# Patient Record
Sex: MALE
[De-identification: names, ages, dates, MRNs, and addresses within clinical notes are randomized per-mention and may not be internally consistent; named-entity substitution may affect disease eponyms.]

---

## 2018-01-17 ENCOUNTER — HOSPITAL ENCOUNTER (EMERGENCY)
Dept: HOSPITAL 31 - C.ER | Age: 23
Discharge: HOME | End: 2018-01-17
Payer: SELF-PAY

## 2018-01-17 VITALS — SYSTOLIC BLOOD PRESSURE: 126 MMHG | DIASTOLIC BLOOD PRESSURE: 70 MMHG | HEART RATE: 84 BPM | TEMPERATURE: 98.8 F

## 2018-01-17 VITALS — RESPIRATION RATE: 18 BRPM | OXYGEN SATURATION: 98 %

## 2018-01-17 DIAGNOSIS — R10.9: Primary | ICD-10-CM

## 2018-01-17 LAB
ALBUMIN SERPL-MCNC: 4.7 G/DL (ref 3.5–5)
ALBUMIN/GLOB SERPL: 1.4 {RATIO} (ref 1–2.1)
ALT SERPL-CCNC: 20 U/L (ref 21–72)
AST SERPL-CCNC: 21 U/L (ref 17–59)
BACTERIA #/AREA URNS HPF: (no result) /[HPF]
BASOPHILS # BLD AUTO: 0 K/UL (ref 0–0.2)
BASOPHILS NFR BLD: 0.3 % (ref 0–2)
BILIRUB UR-MCNC: NEGATIVE MG/DL
BUN SERPL-MCNC: 11 MG/DL (ref 9–20)
CALCIUM SERPL-MCNC: 8.7 MG/DL (ref 8.6–10.4)
EOSINOPHIL # BLD AUTO: 0.2 K/UL (ref 0–0.7)
EOSINOPHIL NFR BLD: 0.9 % (ref 0–4)
EOSINOPHIL NFR BLD: 1 % (ref 0–4)
ERYTHROCYTE [DISTWIDTH] IN BLOOD BY AUTOMATED COUNT: 14.1 % (ref 11.5–14.5)
GFR NON-AFRICAN AMERICAN: > 60
GLUCOSE UR STRIP-MCNC: NORMAL MG/DL
HGB BLD-MCNC: 16.5 G/DL (ref 12–18)
LEUKOCYTE ESTERASE UR-ACNC: (no result) LEU/UL
LYMPHOCYTE: 5 % (ref 20–40)
LYMPHOCYTES # BLD AUTO: 0.9 K/UL (ref 1–4.3)
LYMPHOCYTES NFR BLD AUTO: 5.2 % (ref 20–40)
MCH RBC QN AUTO: 30.2 PG (ref 27–31)
MCHC RBC AUTO-ENTMCNC: 33.6 G/DL (ref 33–37)
MCV RBC AUTO: 90 FL (ref 80–94)
MONOCYTE: 8 % (ref 0–10)
MONOCYTES # BLD: 1.2 K/UL (ref 0–0.8)
MONOCYTES NFR BLD: 6.8 % (ref 0–10)
NEUTROPHILS # BLD: 14.7 K/UL (ref 1.8–7)
NEUTROPHILS NFR BLD AUTO: 85 % (ref 50–75)
NEUTROPHILS NFR BLD AUTO: 86.8 % (ref 50–75)
NEUTS BAND NFR BLD: 1 % (ref 0–2)
NRBC BLD AUTO-RTO: 0 % (ref 0–2)
PH UR STRIP: 7 [PH] (ref 5–8)
PLATELET # BLD EST: NORMAL 10*3/UL
PLATELET # BLD: 208 K/UL (ref 130–400)
PMV BLD AUTO: 9.6 FL (ref 7.2–11.7)
PROT UR STRIP-MCNC: (no result) MG/DL
RBC # BLD AUTO: 5.48 MIL/UL (ref 4.4–5.9)
RBC # UR STRIP: (no result) /UL
RBC MORPH BLD: NORMAL
SP GR UR STRIP: 1.03 (ref 1–1.03)
TOTAL CELLS COUNTED BLD: 100
URINE NITRATE: NEGATIVE
UROBILINOGEN UR-MCNC: 2 MG/DL (ref 0.2–1)
WBC # BLD AUTO: 16.9 K/UL (ref 4.8–10.8)

## 2018-01-17 PROCEDURE — 96361 HYDRATE IV INFUSION ADD-ON: CPT

## 2018-01-17 PROCEDURE — 99285 EMERGENCY DEPT VISIT HI MDM: CPT

## 2018-01-17 PROCEDURE — 87086 URINE CULTURE/COLONY COUNT: CPT

## 2018-01-17 PROCEDURE — 85025 COMPLETE CBC W/AUTO DIFF WBC: CPT

## 2018-01-17 PROCEDURE — 81001 URINALYSIS AUTO W/SCOPE: CPT

## 2018-01-17 PROCEDURE — 80053 COMPREHEN METABOLIC PANEL: CPT

## 2018-01-17 PROCEDURE — 96374 THER/PROPH/DIAG INJ IV PUSH: CPT

## 2018-01-17 PROCEDURE — 74176 CT ABD & PELVIS W/O CONTRAST: CPT

## 2018-01-17 NOTE — C.PDOC
History Of Present Illness


21 y/o male with PMhx of Kidney stones presents to ED with complaints of left 

flank pain  and intermittent fever for 3 days. Patient recently had Lithotripsy 

in [place] and came to ED with concerns of having another kidney stone. Patient 

denies dysuria, hematuria, abdominal pain or any other complaints at this time.

  


Time Seen by Provider: 01/17/18 10:37


Chief Complaint (Nursing): Abdominal Pain


History Per: Patient


History/Exam Limitations: no limitations


Onset/Duration Of Symptoms: Days


Current Symptoms Are (Timing): Still Present





Past Medical History


Reviewed: Historical Data, Nursing Documentation, Vital Signs


Vital Signs: 


 Last Vital Signs











Temp  98.8 F   01/17/18 12:45


 


Pulse  84   01/17/18 12:45


 


Resp  18   01/17/18 12:45


 


BP  126/70   01/17/18 12:45


 


Pulse Ox  98   01/17/18 12:48














- Medical History


PMH: Kidney Stones


Surgical History: No Surg Hx


Family History: States: No Known Family Hx





- Social History


Hx Alcohol Use: No


Hx Substance Use: No





- Immunization History


Hx Tetanus Toxoid Vaccination: No


Hx Influenza Vaccination: Yes


Hx Pneumococcal Vaccination: No





Review Of Systems


Constitutional: Positive for: Fever.  Negative for: Chills


Gastrointestinal: Negative for: Nausea, Vomiting, Abdominal Pain


Musculoskeletal: Positive for: Other (Flank pain).  Negative for: Back Pain


Skin: Negative for: Rash


Neurological: Negative for: Weakness, Numbness





Physical Exam





- Physical Exam


Appears: Non-toxic, No Acute Distress


Skin: Normal Color, Warm, Dry, No Rash


Head: Atraumatic, Normacephalic


Eye(s): bilateral: Normal Inspection


Oral Mucosa: Moist


Neck: Normal ROM, Supple


Cardiovascular: Rhythm Regular


Respiratory: Normal Breath Sounds, No Rales, No Rhonchi, No Wheezing


Gastrointestinal/Abdominal: Soft, No Tenderness, No Guarding, No Rebound


Back: CVA Tenderness (left), No Paraspinal Tenderness


Extremity: Normal ROM, Capillary Refill (<2 seconds)


Neurological/Psych: Oriented x3





ED Course And Treatment





- Laboratory Results


Result Diagrams: 


 01/17/18 11:01





 01/17/18 11:01


Lab Interpretation: No Acute Changes


O2 Sat by Pulse Oximetry: 98 (RA)


Pulse Ox Interpretation: Normal





- CT Scan/US


  ** No standard instances


Other Rad Studies (CT/US): Read By Radiologist, Radiology Report Reviewed


CT/US Interpretation: FINDINGS:  There is limited evaluation of the solid 

organs without the administration of IV contrast.  LOWER THORAX:  No visible 

consolidation, pleural effusion, or pneumothorax.  LIVER:  Unremarkable 

unenhanced appearance.  GALLBLADDER AND BILE DUCTS:  Unremarkable unenhanced 

appearance.  PANCREAS:  Unremarkable unenhanced appearance.  SPLEEN:  

Unremarkable unenhanced appearance.  ADRENALS:  Unremarkable unenhanced 

appearance.  KIDNEYS AND URETERS:  No hydronephrosis or obstructing renal 

calculus. Punctate nonobstructing bilateral renal calculi.  BLADDER:  The 

urinary bladder appears unremarkable.  REPRODUCTIVE:  Unremarkable.  APPENDIX:  

The appendix is not clearly identified. No secondary signs of acute 

appendicitis.  BOWEL:  The stomach is nondistended.  Lack of oral contrast 

limits evaluation for bowel pathology.   The bowel loops appear within normal 

limits of caliber without evidence of intestinal obstruction. Moderate 

constipation.  PERITONEUM:  No significant free fluid. No definite free air.  

LYMPH NODES:  No bulky lymphadenopathy identified.  VASCULATURE:  No aortic 

aneurysm.  BONES:  No acute osseous abnormality is detected.  OTHER FINDINGS:  

None.  IMPRESSION:  Bilateral punctate nonobstructing renal calculi.  No 

hydronephrosis or obstructing renal calculus identified.  Moderate constipation.


Progress Note: Treated with ZIVF NSS and toradol.  On re-evaluation abdomen 

soft in no distress.  Instructed to follow up with urology for further 

evaluation.  Discharged in stable condition


Reassessment Condition: Improved





Medical Decision Making


Medical Decision Making: 


Plan: CT abdomen/pelvis, Blood work, UA, Toradol ordered











Disposition


Counseled Patient/Family Regarding: Studies Performed, Diagnosis, Need For 

Followup, Rx Given





- Disposition


Referrals: 


[facility] [Outside]


[employer] [employer] [facility] [Outside]


Lucrecia Perera MD [Staff Provider] - 


Disposition: HOME/ ROUTINE


Disposition Time: 12:50


Condition: STABLE


Prescriptions: 


Naproxen [Naprosyn] 1 tab PO BID PRN #25 tab


 PRN Reason: Pain


Instructions:  Acute Hematuria (ED)


Forms:  [facility] (English), Work Excuse





- POA


Present On Arrival: None





- Clinical Impression


Clinical Impression: 


 Flank pain








- PA / NP / Resident Statement


MD/DO has reviewed & agrees with the documentation as recorded.





- Scribe Statement


The provider has reviewed the documentation as recorded by the Teri Oquendo





All medical record entries made by the Teri were at my direction and 

personally dictated by me. I have reviewed the chart and agree that the record 

accurately reflects my personal performance of the history, physical exam, 

medical decision making, and the department course for this patient. I have 

also personally directed, reviewed, and agree with the discharge instructions 

and disposition.

## 2018-01-17 NOTE — CT
PROCEDURE:  CT Abdomen and Pelvis without Oral or IV contrast.



HISTORY:

Pain



COMPARISON:

None available



TECHNIQUE:

Contiguous axial images of the abdomen and pelvis. No oral or IV 

contrast administered. Coronal and Sagittal reformats generated and 

reviewed. 



Radiation dose:



Total exam DLP = 311.06 mGy-cm.



This CT exam was performed using one or more of the following dose 

reduction techniques: Automated exposure control, adjustment of the 

mA and/or kV according to patient size, and/or use of iterative 

reconstruction technique.



FINDINGS:

There is limited evaluation of the solid organs without the 

administration of IV contrast.



LOWER THORAX:

No visible consolidation, pleural effusion, or pneumothorax.



LIVER:

Unremarkable unenhanced appearance.



GALLBLADDER AND BILE DUCTS:

Unremarkable unenhanced appearance.



PANCREAS:

Unremarkable unenhanced appearance.



SPLEEN:

Unremarkable unenhanced appearance.



ADRENALS:

Unremarkable unenhanced appearance.



KIDNEYS AND URETERS:

No hydronephrosis or obstructing renal calculus. Punctate 

nonobstructing bilateral renal calculi. 



BLADDER:

The urinary bladder appears unremarkable.



REPRODUCTIVE:

Unremarkable.



APPENDIX:

The appendix is not clearly identified. No secondary signs of acute 

appendicitis.



BOWEL:

The stomach is nondistended. 



Lack of oral contrast limits evaluation for bowel pathology.   The 

bowel loops appear within normal limits of caliber without evidence 

of intestinal obstruction. Moderate constipation. 



PERITONEUM:

No significant free fluid. No definite free air.



LYMPH NODES:

No bulky lymphadenopathy identified.



VASCULATURE:

No aortic aneurysm. 



BONES:

No acute osseous abnormality is detected.



OTHER FINDINGS:

None. 



IMPRESSION:

Bilateral punctate nonobstructing renal calculi.  No hydronephrosis 

or obstructing renal calculus identified.



Moderate constipation.



Additional findings as above.

## 2018-11-05 NOTE — C.PDOC
History Of Present Illness


22 y/o male presents to the ED for evaluation of a hand laceration sustained 1 

hour prior to arrival. While at home taking out trash, patient tripped and fell 

onto a garbage bag that had glass, sustaining a large laceration to the dorsal 

left hand. Patient is right hand dominant. He reports being unable to extend the

2nd, 3rd, and 4th digits. Also complains of slight numbness.





Time Seen by Provider: 11/05/18 19:49


Chief Complaint (Nursing): Abnormal Skin Integrity


History Per: Patient


History/Exam Limitations: no limitations


Onset/Duration Of Symptoms: Hrs (x1)


Current Symptoms Are (Timing): Still Present


Location Of Injury: Left: Hand





Past Medical History


Reviewed: Historical Data, Nursing Documentation, Vital Signs


Vital Signs: 





                                Last Vital Signs











Temp  98.4 F   11/05/18 19:46


 


Pulse  99 H  11/05/18 19:46


 


Resp  20   11/05/18 19:46


 


BP  133/76   11/05/18 19:46


 


Pulse Ox  99   11/05/18 19:46














- Medical History


PMH: No Chronic Diseases, Kidney Stones


Other Surgeries: Tendon surgery in the LEft hand


Family History: States: No Known Family Hx





- Social History


Hx Alcohol Use: No


Hx Substance Use: No





- Immunization History


Hx Tetanus Toxoid Vaccination: No


Hx Influenza Vaccination: No


Hx Pneumococcal Vaccination: No





Review Of Systems


Except As Marked, All Systems Reviewed And Found Negative.


Constitutional: Negative for: Fever


Musculoskeletal: Positive for: Hand Pain


Skin: Positive for: Lesions (to dorsal left hand)


Neurological: Positive for: Weakness (difficulty moving left digits), Numbness. 

Negative for: Other (tingling)





Physical Exam





- Physical Exam


Appears: Non-toxic, No Acute Distress


Skin: Warm, Dry, No Rash


Head: Atraumatic, Normacephalic


Eye(s): bilateral: Normal Inspection, PERRL, EOMI


Chest: Symmetrical


Respiratory: No Accessory Muscle Use


Extremity: No Normal ROM (Unable to extend the 2nd, 3rd, 4th left digits; Full 

flexion and extension of the left thumb), Capillary Refill (less than 2 sec), No

Swelling, Other (2 x 8 cm large laceration to the dorsum of left hand)


Pulses: Left Radial: Normal, Right Radial: Normal


Neurological/Psych: Oriented x3, No Normal Sensation (Patient reports slight 

numbness to left hand)


Gait: Steady





ED Course And Treatment


O2 Sat by Pulse Oximetry: 99 (RA)


Pulse Ox Interpretation: Normal





Laceration





- Laceration Repair


  ** left Hand


Wound Length (In cm): 10


Description Of Wound: Irregular


Wound Cleansed With: Betadine, Sterile Saline


Anesthesia: Lidocaine 1%, With Epi


Wound Examination: Irrigated With Saline, No FB With Wound Exploration


Wound Closure: Suture (12)


Suture Technique And Material Used: Nylon


Wound Complexity: Simple





Medical Decision Making


Medical Decision Making: 





Plan:


8:05pm  Spoke with Dr. Burrell, hand on-call, who recommends the laceration be 

repaired here in the ED, and patient can follow up in his office tomorrow for 

further evaluation and repair. Requests patient be placed in a volar splint.





X-ray taken of left hand. No fracture or foreign bodies seen. The wound was 

irrigated with > 1000cc of pressurized sterile saline and betadine. Lido 2% 

ordered for laceration repair. Tetanus is up to date. 





Disposition





- Disposition


Referrals: 


Case Burrell MD [Staff Provider] - 


Disposition: HOME/ ROUTINE


Disposition Time: 20:30


Condition: STABLE


Additional Instructions: 


YOU MUST FOLLOW UP WITH THE HAND SURGEON WITHIN 1-2 DAYS. RETURN TO WORSENED. 


Prescriptions: 


Acetaminophen [Tylenol] 325 mg PO Q6 PRN #30 tab


 PRN Reason: Pain, Mild (1-3)


Cephalexin [Keflex] 500 mg PO BID #19 capsule


Instructions:  Tendon Laceration (DC)


Forms:  [facility] (English), Work Excuse





- Clinical Impression


Clinical Impression: 


 Tendon laceration, Hand laceration








- PA / NP / Resident Statement


MD/DO has reviewed & agrees with the documentation as recorded.





- Scribe Statement


The provider has reviewed the documentation as recorded by the Scribe (Christina Lewis)





All medical record entries made by the Scribe were at my direction and 

personally dictated by me. I have reviewed the chart and agree that the record 

accurately reflects my personal performance of the history, physical exam, 

medical decision making, and the department course for this patient. I have also

 personally directed, reviewed, and agree with the discharge instructions and 

disposition.

## 2018-11-05 NOTE — C.PDOC
Time Seen by Provider: 11/05/18 19:49


Chief Complaint (Nursing): Abnormal Skin Integrity





Past Medical History


Vital Signs: 





                                Last Vital Signs











Temp  98.4 F   11/05/18 19:46


 


Pulse  99 H  11/05/18 19:46


 


Resp  20   11/05/18 19:46


 


BP  133/76   11/05/18 19:46


 


Pulse Ox  99   11/05/18 19:46














- Medical History


PMH: Kidney Stones





- Social History


Hx Alcohol Use: No


Hx Substance Use: No





- Immunization History


Hx Tetanus Toxoid Vaccination: No


Hx Influenza Vaccination: No


Hx Pneumococcal Vaccination: No





ED Course And Treatment


O2 Sat by Pulse Oximetry: 99





Disposition





- Disposition

## 2018-11-06 NOTE — RAD
PROCEDURE:  Left Hand Radiographs.



HISTORY:

 injury, lac to the dorsal hand, r/o foreign body 



COMPARISON:

None.



FINDINGS:



BONES:

Normal. No fracture. 



JOINTS:

Normal. No osteoarthritic changes. 



SOFT TISSUES:

Normal. 



OTHER FINDINGS:

None.



IMPRESSION:

No evidence of radiopaque foreign body.  No fracture.